# Patient Record
Sex: MALE | Race: BLACK OR AFRICAN AMERICAN | NOT HISPANIC OR LATINO | Employment: FULL TIME | URBAN - METROPOLITAN AREA
[De-identification: names, ages, dates, MRNs, and addresses within clinical notes are randomized per-mention and may not be internally consistent; named-entity substitution may affect disease eponyms.]

---

## 2017-06-01 LAB — CRC RECOMMENDATION EXT: NORMAL

## 2019-09-25 PROBLEM — E11.9 TYPE 2 DIABETES MELLITUS WITHOUT COMPLICATION, WITH LONG-TERM CURRENT USE OF INSULIN: Chronic | Status: ACTIVE | Noted: 2019-09-25

## 2019-09-25 PROBLEM — Z79.4 TYPE 2 DIABETES MELLITUS WITHOUT COMPLICATION, WITH LONG-TERM CURRENT USE OF INSULIN: Chronic | Status: ACTIVE | Noted: 2019-09-25

## 2019-12-13 PROBLEM — Q14.1 CONGENITAL HYPERTROPHY OF RETINAL PIGMENT EPITHELIUM: Status: ACTIVE | Noted: 2019-12-13

## 2019-12-13 PROBLEM — H31.011 MACULAR SCAR OF RIGHT EYE: Status: ACTIVE | Noted: 2019-12-13

## 2020-10-24 PROBLEM — M25.561 ACUTE PAIN OF RIGHT KNEE: Status: ACTIVE | Noted: 2020-10-24

## 2020-11-22 PROBLEM — E11.9 CONTROLLED TYPE 2 DIABETES MELLITUS WITHOUT COMPLICATION, WITH LONG-TERM CURRENT USE OF INSULIN: Status: ACTIVE | Noted: 2019-09-25

## 2020-11-22 PROBLEM — I35.1 AORTIC EJECTION MURMUR: Status: ACTIVE | Noted: 2020-11-22

## 2020-11-22 PROBLEM — Z79.4 CONTROLLED TYPE 2 DIABETES MELLITUS WITHOUT COMPLICATION, WITH LONG-TERM CURRENT USE OF INSULIN: Status: ACTIVE | Noted: 2019-09-25

## 2020-11-26 PROBLEM — I16.1 HYPERTENSIVE EMERGENCY: Status: ACTIVE | Noted: 2020-11-26

## 2020-11-26 PROBLEM — I16.0 HYPERTENSIVE URGENCY: Status: ACTIVE | Noted: 2020-11-26

## 2020-11-27 PROBLEM — I10 SEVERE HYPERTENSION: Status: ACTIVE | Noted: 2020-11-26

## 2020-11-27 PROBLEM — E87.6 HYPOKALEMIA: Status: ACTIVE | Noted: 2020-11-27

## 2020-11-29 PROBLEM — E87.6 HYPOKALEMIA: Status: RESOLVED | Noted: 2020-11-27 | Resolved: 2020-11-29

## 2021-04-15 PROBLEM — N18.2 CKD (CHRONIC KIDNEY DISEASE) STAGE 2, GFR 60-89 ML/MIN: Status: ACTIVE | Noted: 2021-04-15

## 2021-05-19 PROBLEM — B35.3 TINEA PEDIS OF BOTH FEET: Status: ACTIVE | Noted: 2021-05-19

## 2021-05-25 PROBLEM — R80.9 MICROALBUMINURIA: Chronic | Status: ACTIVE | Noted: 2021-05-25

## 2021-05-25 PROBLEM — I10 SEVERE HYPERTENSION: Status: RESOLVED | Noted: 2020-11-26 | Resolved: 2021-05-25

## 2021-07-14 ENCOUNTER — PATIENT OUTREACH (OUTPATIENT)
Dept: ADMINISTRATIVE | Facility: HOSPITAL | Age: 61
End: 2021-07-14

## 2021-07-14 DIAGNOSIS — Z12.12 ENCOUNTER FOR COLORECTAL CANCER SCREENING: Primary | ICD-10-CM

## 2021-07-14 DIAGNOSIS — Z12.11 ENCOUNTER FOR COLORECTAL CANCER SCREENING: Primary | ICD-10-CM

## 2021-07-27 PROBLEM — R91.1 LUNG NODULE: Status: ACTIVE | Noted: 2021-07-27

## 2021-10-11 PROBLEM — M70.50 PES ANSERINE BURSITIS: Status: ACTIVE | Noted: 2021-10-11

## 2021-10-11 PROBLEM — E66.3 OVERWEIGHT (BMI 25.0-29.9): Status: ACTIVE | Noted: 2021-10-11

## 2021-10-11 PROBLEM — I35.1 AORTIC EJECTION MURMUR: Chronic | Status: ACTIVE | Noted: 2020-11-22

## 2021-10-11 PROBLEM — N18.2 CKD (CHRONIC KIDNEY DISEASE) STAGE 2, GFR 60-89 ML/MIN: Chronic | Status: ACTIVE | Noted: 2021-04-15

## 2021-11-04 PROBLEM — M54.16 LUMBAR RADICULOPATHY: Status: ACTIVE | Noted: 2021-11-04

## 2021-11-04 PROBLEM — G57.51: Status: ACTIVE | Noted: 2021-11-04

## 2021-12-05 PROBLEM — N52.9 ERECTILE DYSFUNCTION: Status: ACTIVE | Noted: 2021-12-05

## 2021-12-05 PROBLEM — J30.89 NON-SEASONAL ALLERGIC RHINITIS: Chronic | Status: ACTIVE | Noted: 2021-12-05

## 2021-12-05 PROBLEM — J30.89 NON-SEASONAL ALLERGIC RHINITIS: Status: ACTIVE | Noted: 2021-12-05

## 2021-12-05 PROBLEM — N52.9 ERECTILE DYSFUNCTION: Chronic | Status: ACTIVE | Noted: 2021-12-05

## 2022-05-31 PROBLEM — J90 PLEURAL EFFUSION: Status: ACTIVE | Noted: 2022-05-31

## 2022-05-31 PROBLEM — I50.32 CHRONIC DIASTOLIC HEART FAILURE: Status: ACTIVE | Noted: 2022-05-31

## 2022-05-31 PROBLEM — I10 PRIMARY HYPERTENSION: Status: ACTIVE | Noted: 2022-05-31

## 2022-05-31 PROBLEM — N17.9 AKI (ACUTE KIDNEY INJURY): Status: ACTIVE | Noted: 2022-05-31

## 2022-05-31 PROBLEM — I16.1 HYPERTENSIVE EMERGENCY: Status: ACTIVE | Noted: 2022-05-31

## 2022-06-01 PROBLEM — E11.9 T2DM (TYPE 2 DIABETES MELLITUS): Status: ACTIVE | Noted: 2022-06-01

## 2022-06-01 PROBLEM — R19.7 DIARRHEA: Status: RESOLVED | Noted: 2022-06-01 | Resolved: 2022-06-01

## 2022-06-01 PROBLEM — R94.31 T WAVE INVERSION IN EKG: Status: ACTIVE | Noted: 2022-06-01

## 2022-06-01 PROBLEM — R91.8 GROUND GLASS OPACITY PRESENT ON IMAGING OF LUNG: Status: ACTIVE | Noted: 2022-06-01

## 2022-06-01 PROBLEM — R19.7 DIARRHEA: Status: ACTIVE | Noted: 2022-06-01

## 2022-06-01 PROBLEM — E87.6 HYPOKALEMIA: Status: RESOLVED | Noted: 2020-11-27 | Resolved: 2022-06-01

## 2022-06-02 PROBLEM — N17.9 AKI (ACUTE KIDNEY INJURY): Status: RESOLVED | Noted: 2022-05-31 | Resolved: 2022-06-02

## 2022-07-01 PROBLEM — G47.33 OSA (OBSTRUCTIVE SLEEP APNEA): Chronic | Status: ACTIVE | Noted: 2022-07-01

## 2022-07-03 ENCOUNTER — PATIENT OUTREACH (OUTPATIENT)
Dept: ADMINISTRATIVE | Facility: HOSPITAL | Age: 62
End: 2022-07-03

## 2022-07-03 NOTE — LETTER
AUTHORIZATION FOR RELEASE OF   CONFIDENTIAL INFORMATION    Dear [facility] Medical Records,    We are seeing Denver Wise, date of birth 1960, in the clinic at [facility]. Chava Bangura MD is the patient's PCP. Denver Wise has an outstanding lab/procedure at the time we reviewed his chart. In order to help keep his health information updated, he has authorized us to request the following medical record(s):        (  )  MAMMOGRAM                                      ( X )  COLONOSCOPY      (  )  PAP SMEAR                                          (  )  OUTSIDE LAB RESULTS     (  )  DEXA SCAN                                          (  )  DM EYE EXAM            (  )  FOOT EXAM                                          (  )  ENTIRE RECORD     (  )  OUTSIDE IMMUNIZATIONS                 (  )  _______________        Please fax records to [facility],  [phone]       If you have any questions, please contact , Lady at [phone].           Patient Name: Denver Wise  : 1960  Patient Phone #: [phone]

## 2022-07-04 NOTE — PROGRESS NOTES
Health Maintenance Due   Topic Date Due    Colorectal Cancer Screening  Never done    Shingles Vaccine (1 of 2) Never done    Pneumococcal Vaccines (Age 0-64) (2 - PCV) 05/22/2019    COVID-19 Vaccine (4 - Booster for Pfizer series) 04/15/2022    Foot Exam  05/17/2022   faxed REBEKAH to [phone] for colonoscopy report

## 2022-09-15 PROBLEM — E66.3 OVERWEIGHT (BMI 25.0-29.9): Status: RESOLVED | Noted: 2021-10-11 | Resolved: 2022-09-15

## 2022-09-15 PROBLEM — R91.1 PULMONARY NODULE, RIGHT: Status: RESOLVED | Noted: 2021-07-27 | Resolved: 2022-09-15

## 2022-09-15 PROBLEM — E78.00 PURE HYPERCHOLESTEROLEMIA: Status: ACTIVE | Noted: 2022-09-15

## 2022-09-16 PROBLEM — N18.2 CKD (CHRONIC KIDNEY DISEASE) STAGE 2, GFR 60-89 ML/MIN: Status: ACTIVE | Noted: 2022-09-16

## 2022-09-16 PROBLEM — N18.31 STAGE 3A CHRONIC KIDNEY DISEASE: Status: ACTIVE | Noted: 2022-09-16

## 2022-09-16 PROBLEM — E78.5 HYPERLIPIDEMIA: Status: ACTIVE | Noted: 2022-09-16

## 2022-09-16 PROBLEM — I1A.0 RESISTANT HYPERTENSION: Status: ACTIVE | Noted: 2022-05-31

## 2022-09-21 ENCOUNTER — PATIENT OUTREACH (OUTPATIENT)
Dept: ADMINISTRATIVE | Facility: HOSPITAL | Age: 62
End: 2022-09-21

## 2023-03-24 PROBLEM — E78.5 HYPERLIPIDEMIA: Status: ACTIVE | Noted: 2023-03-24

## 2023-09-25 PROBLEM — N18.32 STAGE 3B CHRONIC KIDNEY DISEASE: Status: ACTIVE | Noted: 2023-09-25

## 2024-01-25 ENCOUNTER — PATIENT OUTREACH (OUTPATIENT)
Dept: ADMINISTRATIVE | Facility: HOSPITAL | Age: 64
End: 2024-01-25

## 2024-01-25 DIAGNOSIS — E11.9 DIABETES MELLITUS WITHOUT COMPLICATION: Primary | ICD-10-CM

## 2024-01-26 PROBLEM — I50.32 CHRONIC DIASTOLIC HEART FAILURE: Chronic | Status: ACTIVE | Noted: 2022-05-31

## 2024-01-26 PROBLEM — N18.32 STAGE 3B CHRONIC KIDNEY DISEASE: Chronic | Status: ACTIVE | Noted: 2023-09-25

## 2024-05-02 ENCOUNTER — PATIENT OUTREACH (OUTPATIENT)
Dept: ADMINISTRATIVE | Facility: HOSPITAL | Age: 64
End: 2024-05-02

## 2024-05-29 PROBLEM — N18.9 ACUTE KIDNEY INJURY SUPERIMPOSED ON CKD: Status: ACTIVE | Noted: 2022-05-31

## 2024-05-29 PROBLEM — I16.1 HYPERTENSIVE EMERGENCY: Status: ACTIVE | Noted: 2024-05-29

## 2024-05-29 PROBLEM — I10 ESSENTIAL HYPERTENSION: Chronic | Status: ACTIVE | Noted: 2022-05-31

## 2024-05-29 PROBLEM — R11.10 VOMITING: Status: ACTIVE | Noted: 2024-05-29

## 2024-05-29 PROBLEM — R10.9 ABDOMINAL PAIN: Status: ACTIVE | Noted: 2024-05-29

## 2024-05-29 PROBLEM — K55.9 SMALL BOWEL ISCHEMIA: Status: ACTIVE | Noted: 2024-05-29

## 2024-05-29 PROBLEM — I16.0 HYPERTENSIVE URGENCY: Status: ACTIVE | Noted: 2024-05-29

## 2024-05-31 PROBLEM — N17.9 ACUTE KIDNEY INJURY SUPERIMPOSED ON CKD: Status: RESOLVED | Noted: 2022-05-31 | Resolved: 2024-05-31

## 2024-05-31 PROBLEM — R11.2 NAUSEA AND VOMITING: Status: ACTIVE | Noted: 2024-05-29

## 2024-05-31 PROBLEM — N18.9 ACUTE KIDNEY INJURY SUPERIMPOSED ON CKD: Status: RESOLVED | Noted: 2022-05-31 | Resolved: 2024-05-31

## 2024-05-31 PROBLEM — R00.1 BRADYCARDIA: Status: ACTIVE | Noted: 2024-05-31

## 2024-05-31 PROBLEM — R11.10 VOMITING: Status: RESOLVED | Noted: 2024-05-29 | Resolved: 2024-05-31

## 2024-05-31 PROBLEM — N18.31 CKD STAGE 3A, GFR 45-59 ML/MIN: Status: ACTIVE | Noted: 2024-05-31

## 2024-06-01 PROBLEM — I16.0 HYPERTENSIVE URGENCY: Status: RESOLVED | Noted: 2024-05-29 | Resolved: 2024-06-01

## 2024-06-01 PROBLEM — R10.9 ABDOMINAL PAIN: Status: RESOLVED | Noted: 2024-05-29 | Resolved: 2024-06-01

## 2024-06-01 PROBLEM — R00.1 BRADYCARDIA: Status: RESOLVED | Noted: 2024-05-31 | Resolved: 2024-06-01

## 2024-06-01 PROBLEM — R10.84 GENERALIZED ABDOMINAL PAIN: Status: ACTIVE | Noted: 2024-05-29

## 2024-06-01 PROBLEM — R11.2 NAUSEA AND VOMITING: Status: RESOLVED | Noted: 2024-05-29 | Resolved: 2024-06-01

## 2024-06-03 PROBLEM — E87.6 HYPOKALEMIA: Status: RESOLVED | Noted: 2020-11-27 | Resolved: 2024-06-03

## 2024-06-03 PROBLEM — R10.84 GENERALIZED ABDOMINAL PAIN: Status: RESOLVED | Noted: 2024-05-29 | Resolved: 2024-06-03

## 2024-06-03 PROBLEM — I16.1 HYPERTENSIVE EMERGENCY: Status: RESOLVED | Noted: 2024-05-29 | Resolved: 2024-06-03

## 2024-06-03 PROBLEM — K55.9 SMALL BOWEL ISCHEMIA: Status: RESOLVED | Noted: 2024-05-29 | Resolved: 2024-06-03

## 2024-07-08 PROBLEM — N18.31 CKD STAGE 3A, GFR 45-59 ML/MIN: Chronic | Status: ACTIVE | Noted: 2024-05-31

## 2024-07-25 ENCOUNTER — PATIENT OUTREACH (OUTPATIENT)
Dept: ADMINISTRATIVE | Facility: HOSPITAL | Age: 64
End: 2024-07-25

## 2024-07-25 DIAGNOSIS — E11.9 DIABETES MELLITUS WITHOUT COMPLICATION: Primary | ICD-10-CM

## 2024-11-05 PROBLEM — E55.9 VITAMIN D DEFICIENCY, UNSPECIFIED: Status: ACTIVE | Noted: 2024-11-05

## 2024-12-06 PROBLEM — E11.21 DIABETIC NEPHROPATHY ASSOCIATED WITH TYPE 2 DIABETES MELLITUS: Status: ACTIVE | Noted: 2024-12-06

## 2025-01-27 ENCOUNTER — PATIENT OUTREACH (OUTPATIENT)
Dept: ADMINISTRATIVE | Facility: HOSPITAL | Age: 65
End: 2025-01-27

## 2025-01-27 DIAGNOSIS — E11.9 DIABETES MELLITUS WITHOUT COMPLICATION: Primary | ICD-10-CM

## 2025-03-21 ENCOUNTER — PATIENT OUTREACH (OUTPATIENT)
Dept: ADMINISTRATIVE | Facility: HOSPITAL | Age: 65
End: 2025-03-21

## 2025-06-12 ENCOUNTER — TELEPHONE (OUTPATIENT)
Dept: ADMINISTRATIVE | Facility: HOSPITAL | Age: 65
End: 2025-06-12

## 2025-06-12 ENCOUNTER — PATIENT OUTREACH (OUTPATIENT)
Dept: ADMINISTRATIVE | Facility: HOSPITAL | Age: 65
End: 2025-06-12

## 2025-09-03 ENCOUNTER — PATIENT OUTREACH (OUTPATIENT)
Dept: ADMINISTRATIVE | Facility: HOSPITAL | Age: 65
End: 2025-09-03

## 2025-09-03 DIAGNOSIS — E11.9 DIABETES MELLITUS WITHOUT COMPLICATION: Primary | ICD-10-CM
